# Patient Record
(demographics unavailable — no encounter records)

---

## 2025-02-25 NOTE — HISTORY OF PRESENT ILLNESS
[FreeTextEntry1] : 30 y/o  presents for annual GYN exam.  SAB x 1.  Not currently sexually active.  Desires STD testing.  Monthly periods, normal amount and duration (5-7).  Had midcycle bleeding in January, was very stressed due to process of citizenship exam.  Went to ER, the end of January and was found to have a left ovarian cyst likely functional and thin endometrium. Normal period the end of January.  No bleeding since that time.  Overall good health.  Never vaccinated for HPV.  Denies any cervical dysplasia.  Denies FH of ca of ovary, colon, uterus or breast ca.  Lives with older sister and her family.  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

## 2025-02-25 NOTE — PHYSICAL EXAM
[Chaperone Declined] : Patient declined chaperone [Appropriately responsive] : appropriately responsive [Alert] : alert [No Acute Distress] : no acute distress [Soft] : soft [Non-tender] : non-tender [Non-distended] : non-distended [No HSM] : No HSM [No Mass] : no mass [Oriented x3] : oriented x3 [Examination Of The Breasts] : a normal appearance [No Discharge] : no discharge [No Masses] : no breast masses were palpable [No Lesions] : no lesions  [Labia Majora] : normal [Labia Minora] : normal [Pink Rugae] : pink rugae [Normal] : normal [Uterine Adnexae] : normal [FreeTextEntry6] : No bilateral axillary LAD [Tenderness] : nontender [Enlarged ___ wks] : not enlarged [FreeTextEntry5] : No CMT [FreeTextEntry8] : No adnexal masses or tenderness